# Patient Record
Sex: FEMALE | Race: ASIAN | NOT HISPANIC OR LATINO | URBAN - METROPOLITAN AREA
[De-identification: names, ages, dates, MRNs, and addresses within clinical notes are randomized per-mention and may not be internally consistent; named-entity substitution may affect disease eponyms.]

---

## 2021-04-01 ENCOUNTER — OFFICE VISIT (OUTPATIENT)
Dept: URBAN - METROPOLITAN AREA CLINIC 35 | Facility: CLINIC | Age: 66
End: 2021-04-01

## 2021-04-01 VITALS
BODY MASS INDEX: 17.47 KG/M2 | HEART RATE: 84 BPM | OXYGEN SATURATION: 97 % | HEIGHT: 60 IN | WEIGHT: 89 LBS | SYSTOLIC BLOOD PRESSURE: 130 MMHG | DIASTOLIC BLOOD PRESSURE: 75 MMHG

## 2021-04-01 PROBLEM — 71457002 OROPHARYNGEAL DYSPHAGIA: Status: ACTIVE | Noted: 2021-04-01

## 2021-04-01 RX ORDER — SODIUM SULFATE, POTASSIUM SULFATE, MAGNESIUM SULFATE 17.5; 3.13; 1.6 G/ML; G/ML; G/ML
AS DIRECTED SOLUTION, CONCENTRATE ORAL 1
Qty: 1 | Refills: 0 | Status: ON HOLD | COMMUNITY
Start: 2016-03-21

## 2021-04-01 RX ORDER — FAMOTIDINE 40 MG/1
1 TABLET AT BEDTIME TABLET, FILM COATED ORAL ONCE A DAY
Qty: 30 | Status: ACTIVE | COMMUNITY

## 2021-04-01 RX ORDER — ASPIRIN 81 MG/1
1 TABLET TABLET, COATED ORAL ONCE A DAY
Status: ON HOLD | COMMUNITY

## 2021-04-01 RX ORDER — PETROLATUM,WHITE/LANOLIN
OINTMENT (GRAM) TOPICAL
Status: ACTIVE | COMMUNITY

## 2021-04-01 RX ORDER — UBIDECARENONE 30 MG
CAPSULE ORAL
Status: ON HOLD | COMMUNITY

## 2021-04-01 RX ORDER — DIPHENHYDRAMINE HCL 25 MG
1 CAPSULE AS NEEDED CAPSULE ORAL
Status: ACTIVE | COMMUNITY

## 2021-04-01 RX ORDER — OMEPRAZOLE 40 MG/1
1 CAPSULE CAPSULE, DELAYED RELEASE ORAL
Qty: 30 | Status: ACTIVE | COMMUNITY

## 2021-04-01 NOTE — HPI-MIGRATED HPI
;     Dysphagia : 64 y/o female patient admits recently onset of dysphagia for 2-3 years. The symptoms include/are described as food particles getting stuck in her throat or globus.  Patient states that she has tried Omeprazole for 2 years and Famotidine for 1 year with no efficacy.  Patient notes that she drinks coffee or tea daily in the morning and 1-2 glasses of wine daily at evening. Patient admits having to cough at night time and feels anxious that she will end up choking. Patient admits bloating/gas, indigestion, early satiety, changes in appetite, coughing, abdominal/epigastric pain.  Patient denies excessive belching, globus, sour eructations, or changes in bowel habits.  Patient states that she has not had any recent changes in their medications.  Patient admits 1 -2 formed movements per day with no mucus, melena, or blood in stools. Patient admits having colonoscopy in 2016 with Dr. Gatiac with internal hemorrhoids.   Patient denies family hx of gastric/esophageal cancer or diseases. Patient denies past EGD which was performed in past. ;

## 2023-10-10 ENCOUNTER — OFFICE VISIT (OUTPATIENT)
Dept: URBAN - METROPOLITAN AREA CLINIC 35 | Facility: CLINIC | Age: 68
End: 2023-10-10

## 2023-12-11 ENCOUNTER — CLAIMS CREATED FROM THE CLAIM WINDOW (OUTPATIENT)
Dept: URBAN - METROPOLITAN AREA CLINIC 35 | Facility: CLINIC | Age: 68
End: 2023-12-11
Payer: MEDICARE

## 2023-12-11 ENCOUNTER — TELEPHONE ENCOUNTER (OUTPATIENT)
Dept: URBAN - METROPOLITAN AREA CLINIC 35 | Facility: CLINIC | Age: 68
End: 2023-12-11

## 2023-12-11 ENCOUNTER — DASHBOARD ENCOUNTERS (OUTPATIENT)
Age: 68
End: 2023-12-11

## 2023-12-11 VITALS
BODY MASS INDEX: 16.88 KG/M2 | SYSTOLIC BLOOD PRESSURE: 112 MMHG | DIASTOLIC BLOOD PRESSURE: 78 MMHG | HEIGHT: 60 IN | WEIGHT: 86 LBS

## 2023-12-11 DIAGNOSIS — L29.0 PRURITUS ANI: ICD-10-CM

## 2023-12-11 DIAGNOSIS — R05.3 CHRONIC COUGH: ICD-10-CM

## 2023-12-11 DIAGNOSIS — K64.8 OTHER HEMORRHOIDS: ICD-10-CM

## 2023-12-11 PROCEDURE — 99214 OFFICE O/P EST MOD 30 MIN: CPT | Performed by: PHYSICIAN ASSISTANT

## 2023-12-11 RX ORDER — ASPIRIN 81 MG/1
1 TABLET TABLET, COATED ORAL ONCE A DAY
Status: ON HOLD | COMMUNITY

## 2023-12-11 RX ORDER — SODIUM SULFATE, POTASSIUM SULFATE, MAGNESIUM SULFATE 17.5; 3.13; 1.6 G/ML; G/ML; G/ML
AS DIRECTED SOLUTION, CONCENTRATE ORAL 1
Qty: 1 | Refills: 0 | Status: ON HOLD | COMMUNITY
Start: 2016-03-21

## 2023-12-11 RX ORDER — DIPHENHYDRAMINE HCL 25 MG
1 CAPSULE AS NEEDED CAPSULE ORAL
Status: ACTIVE | COMMUNITY

## 2023-12-11 RX ORDER — PHENYLEPHRINE HYDROCHLORIDE 6.25 MG/1
AS DIRECTED SUPPOSITORY RECTAL
Qty: 60 | Refills: 3 | OUTPATIENT
Start: 2023-12-11 | End: 2024-04-09

## 2023-12-11 RX ORDER — PETROLATUM,WHITE/LANOLIN
OINTMENT (GRAM) TOPICAL
Status: ON HOLD | COMMUNITY

## 2023-12-11 RX ORDER — PANTOPRAZOLE SODIUM 40 MG/1
1 TABLET TABLET, DELAYED RELEASE ORAL ONCE A DAY
Qty: 30 | OUTPATIENT
Start: 2023-12-11

## 2023-12-11 RX ORDER — FAMOTIDINE 40 MG/1
1 TABLET AT BEDTIME TABLET, FILM COATED ORAL ONCE A DAY
Qty: 30 | Status: ACTIVE | COMMUNITY

## 2023-12-11 RX ORDER — OMEPRAZOLE 40 MG/1
1 CAPSULE CAPSULE, DELAYED RELEASE ORAL
Qty: 30 | Status: ACTIVE | COMMUNITY

## 2023-12-11 RX ORDER — UBIDECARENONE 30 MG
CAPSULE ORAL
Status: ON HOLD | COMMUNITY

## 2023-12-11 NOTE — HPI-HEMORRHOIDS
67 year old female patient presents today for severe hemorrhoids. She admits they have been bothering her for over a year. She admits to a Colonoscopy 02/03/2023 in [facility] and found hemorrhoids. She admits incomplete emptying when she has a bowel movement. She admits to rectal pain and pruritus ani. She admits trying Preparation H cream and wipes with short periods of relief. She admits her doctor prescribed Anucort which only works for short periods of time. She denies any blood when wiping. She admits she will have daily bowel movements, multiple a day with #4 on the [place] Scale, takes Miralax daily.

## 2023-12-11 NOTE — PHYSICAL EXAM CONSTITUTIONAL:
well developed, well nourished , in no acute distress , ambulating without difficulty , normal communication ability
07-Nov-2019

## 2024-01-03 ENCOUNTER — OFFICE VISIT (OUTPATIENT)
Dept: URBAN - METROPOLITAN AREA CLINIC 33 | Facility: CLINIC | Age: 69
End: 2024-01-03

## 2024-09-06 ENCOUNTER — OFFICE VISIT (OUTPATIENT)
Dept: URBAN - METROPOLITAN AREA CLINIC 35 | Facility: CLINIC | Age: 69
End: 2024-09-06

## 2024-09-06 ENCOUNTER — OFFICE VISIT (OUTPATIENT)
Dept: URBAN - METROPOLITAN AREA CLINIC 35 | Facility: CLINIC | Age: 69
End: 2024-09-06
Payer: MEDICARE

## 2024-09-06 VITALS
DIASTOLIC BLOOD PRESSURE: 76 MMHG | SYSTOLIC BLOOD PRESSURE: 110 MMHG | HEIGHT: 60 IN | BODY MASS INDEX: 16.88 KG/M2 | WEIGHT: 86 LBS

## 2024-09-06 DIAGNOSIS — K64.8 OTHER HEMORRHOIDS: ICD-10-CM

## 2024-09-06 DIAGNOSIS — L29.0 PRURITUS ANI: ICD-10-CM

## 2024-09-06 DIAGNOSIS — R13.14 PHARYNGOESOPHAGEAL DYSPHAGIA: ICD-10-CM

## 2024-09-06 DIAGNOSIS — R05.3 CHRONIC COUGH: ICD-10-CM

## 2024-09-06 PROBLEM — 40739000: Status: ACTIVE | Noted: 2024-09-06

## 2024-09-06 PROCEDURE — 99214 OFFICE O/P EST MOD 30 MIN: CPT | Performed by: PHYSICIAN ASSISTANT

## 2024-09-06 RX ORDER — ASPIRIN 81 MG/1
1 TABLET TABLET, COATED ORAL ONCE A DAY
Status: ON HOLD | COMMUNITY

## 2024-09-06 RX ORDER — PANTOPRAZOLE SODIUM 40 MG/1
1 TABLET TABLET, DELAYED RELEASE ORAL ONCE A DAY
Qty: 30 | Status: ACTIVE | COMMUNITY
Start: 2023-12-11

## 2024-09-06 RX ORDER — FAMOTIDINE 40 MG/1
1 TABLET AT BEDTIME TABLET, FILM COATED ORAL ONCE A DAY
Qty: 30 | Status: ACTIVE | COMMUNITY

## 2024-09-06 RX ORDER — OMEPRAZOLE 40 MG/1
1 CAPSULE CAPSULE, DELAYED RELEASE ORAL
Qty: 30 | Status: ON HOLD | COMMUNITY

## 2024-09-06 RX ORDER — ATORVASTATIN CALCIUM 20 MG/1
1 TABLET TABLET, FILM COATED ORAL ONCE A DAY
Status: ACTIVE | COMMUNITY

## 2024-09-06 RX ORDER — PANTOPRAZOLE SODIUM 40 MG/1
1 TABLET TABLET, DELAYED RELEASE ORAL ONCE A DAY
Qty: 30 | OUTPATIENT

## 2024-09-06 RX ORDER — SODIUM SULFATE, POTASSIUM SULFATE, MAGNESIUM SULFATE 17.5; 3.13; 1.6 G/ML; G/ML; G/ML
AS DIRECTED SOLUTION, CONCENTRATE ORAL 1
Qty: 1 | Refills: 0 | Status: ON HOLD | COMMUNITY
Start: 2016-03-21

## 2024-09-06 RX ORDER — DIPHENHYDRAMINE HCL 25 MG
1 CAPSULE AS NEEDED CAPSULE ORAL
Status: ON HOLD | COMMUNITY

## 2024-09-06 RX ORDER — UBIDECARENONE 30 MG
CAPSULE ORAL
Status: ON HOLD | COMMUNITY

## 2024-09-06 RX ORDER — PETROLATUM,WHITE/LANOLIN
OINTMENT (GRAM) TOPICAL
Status: ON HOLD | COMMUNITY

## 2024-09-06 NOTE — HPI-HEMORRHOIDS
67 year old female patient presents today for severe hemorrhoids. She admits they have been bothering her for over a year. She admits to a Colonoscopy 02/03/2023 in [facility] and found hemorrhoids. She admits incomplete emptying when she has a bowel movement. She admits to rectal pain and pruritus ani. She admits trying Preparation H cream and wipes with short periods of relief. She admits her doctor prescribed Anucort which only works for short periods of time. She denies any blood when wiping. She admits she will have daily bowel movements, multiple a day with #4 on the [place] Scale, takes Miralax daily. She feels she is iincompletely evacuating when she has a BM.

## 2024-09-06 NOTE — HPI-ACID REFLUX
Patient presents today for a follow up. She admits continued mucus in her throat and coughing. She admits to dysphagia. She admits dysphagia with both solids and liquids. She admits use of Pantoprazole and Famotidine. She has never had an EGD.  She is on Omeprazole 20mg daily. She denies any heartburn, N/V, early satiety, abdominal hannah.  Last visit: Pt admits to mucus in her throat and gets cough.  She thinks it's reflux related and has tried Omeprazole 20mg daily without relief. Pt has been on this medication for 2 years. She needs an EGD, but having shoulder issues and can't have a procedure at thsi time.  No dysphagia, heartburn, N/V, early satiety.

## 2024-10-09 ENCOUNTER — OFFICE VISIT (OUTPATIENT)
Dept: URBAN - METROPOLITAN AREA SURGERY CENTER 8 | Facility: SURGERY CENTER | Age: 69
End: 2024-10-09

## 2024-10-09 ENCOUNTER — CLAIMS CREATED FROM THE CLAIM WINDOW (OUTPATIENT)
Dept: URBAN - METROPOLITAN AREA SURGERY CENTER 8 | Facility: SURGERY CENTER | Age: 69
End: 2024-10-09
Payer: MEDICARE

## 2024-10-09 ENCOUNTER — CLAIMS CREATED FROM THE CLAIM WINDOW (OUTPATIENT)
Dept: URBAN - METROPOLITAN AREA CLINIC 4 | Facility: CLINIC | Age: 69
End: 2024-10-09
Payer: MEDICARE

## 2024-10-09 DIAGNOSIS — K21.9 ACID REFLUX: ICD-10-CM

## 2024-10-09 DIAGNOSIS — R13.10 DYSPHAGIA: ICD-10-CM

## 2024-10-09 DIAGNOSIS — K21.9 GASTRO-ESOPHAGEAL REFLUX DISEASE WITHOUT ESOPHAGITIS: ICD-10-CM

## 2024-10-09 DIAGNOSIS — K22.4 ESOPHAGEAL DYSMOTILITY: ICD-10-CM

## 2024-10-09 DIAGNOSIS — B96.81 BACTERIAL INFECTION DUE TO H. PYLORI: ICD-10-CM

## 2024-10-09 DIAGNOSIS — K22.89 OTHER SPECIFIED DISEASE OF ESOPHAGUS: ICD-10-CM

## 2024-10-09 DIAGNOSIS — R13.19 OTHER DYSPHAGIA: ICD-10-CM

## 2024-10-09 DIAGNOSIS — K31.89 OTHER DISEASES OF STOMACH AND DUODENUM: ICD-10-CM

## 2024-10-09 DIAGNOSIS — K29.60 OTHER GASTRITIS WITHOUT BLEEDING: ICD-10-CM

## 2024-10-09 DIAGNOSIS — B96.81 HELICOBACTER PYLORI [H. PYLORI] AS THE CAUSE OF DISEASES CLASSIFIED ELSEWHERE: ICD-10-CM

## 2024-10-09 DIAGNOSIS — K44.9 HIATAL HERNIA: ICD-10-CM

## 2024-10-09 PROBLEM — 90446007: Status: ACTIVE | Noted: 2024-10-09

## 2024-10-09 PROBLEM — 249648007: Status: ACTIVE | Noted: 2024-10-09

## 2024-10-09 PROCEDURE — 43239 EGD BIOPSY SINGLE/MULTIPLE: CPT | Performed by: INTERNAL MEDICINE

## 2024-10-09 PROCEDURE — 00731 ANES UPR GI NDSC PX NOS: CPT | Performed by: NURSE ANESTHETIST, CERTIFIED REGISTERED

## 2024-10-09 PROCEDURE — 43248 EGD GUIDE WIRE INSERTION: CPT | Performed by: INTERNAL MEDICINE

## 2024-10-09 PROCEDURE — 88305 TISSUE EXAM BY PATHOLOGIST: CPT | Performed by: PATHOLOGY

## 2024-10-09 PROCEDURE — 88342 IMHCHEM/IMCYTCHM 1ST ANTB: CPT | Performed by: PATHOLOGY

## 2024-10-09 RX ORDER — NIFEDIPINE
PEA SIZE AMOUNT POWDER (GRAM) MISCELLANEOUS
Qty: 30 GRAMS | Refills: 1 | OUTPATIENT
Start: 2024-10-09 | End: 2024-12-07

## 2024-10-09 RX ORDER — UBIDECARENONE 30 MG
CAPSULE ORAL
Status: ON HOLD | COMMUNITY

## 2024-10-09 RX ORDER — DIPHENHYDRAMINE HCL 25 MG
1 CAPSULE AS NEEDED CAPSULE ORAL
Status: ON HOLD | COMMUNITY

## 2024-10-09 RX ORDER — SODIUM SULFATE, POTASSIUM SULFATE, MAGNESIUM SULFATE 17.5; 3.13; 1.6 G/ML; G/ML; G/ML
AS DIRECTED SOLUTION, CONCENTRATE ORAL 1
Qty: 1 | Refills: 0 | Status: ON HOLD | COMMUNITY
Start: 2016-03-21

## 2024-10-09 RX ORDER — PETROLATUM,WHITE/LANOLIN
OINTMENT (GRAM) TOPICAL
Status: ON HOLD | COMMUNITY

## 2024-10-09 RX ORDER — CLOTRIMAZOLE 1 %
1 APPLICATION CREAM (GRAM) TOPICAL TWICE A DAY
Qty: 30 GRAM | Refills: 1 | OUTPATIENT
Start: 2024-10-09 | End: 2024-11-05

## 2024-10-09 RX ORDER — PANTOPRAZOLE SODIUM 40 MG/1
1 TABLET TABLET, DELAYED RELEASE ORAL ONCE A DAY
Qty: 30 | Status: ACTIVE | COMMUNITY

## 2024-10-09 RX ORDER — FAMOTIDINE 40 MG/1
1 TABLET AT BEDTIME TABLET, FILM COATED ORAL ONCE A DAY
Qty: 30 | Status: ACTIVE | COMMUNITY

## 2024-10-09 RX ORDER — OMEPRAZOLE 40 MG/1
1 CAPSULE CAPSULE, DELAYED RELEASE ORAL
Qty: 30 | Status: ON HOLD | COMMUNITY

## 2024-10-09 RX ORDER — ATORVASTATIN CALCIUM 20 MG/1
1 TABLET TABLET, FILM COATED ORAL ONCE A DAY
Status: ACTIVE | COMMUNITY

## 2024-10-09 RX ORDER — ASPIRIN 81 MG/1
1 TABLET TABLET, COATED ORAL ONCE A DAY
Status: ON HOLD | COMMUNITY

## 2024-10-31 ENCOUNTER — TELEPHONE ENCOUNTER (OUTPATIENT)
Dept: URBAN - METROPOLITAN AREA CLINIC 35 | Facility: CLINIC | Age: 69
End: 2024-10-31

## 2024-11-01 ENCOUNTER — OFFICE VISIT (OUTPATIENT)
Dept: URBAN - METROPOLITAN AREA CLINIC 35 | Facility: CLINIC | Age: 69
End: 2024-11-01
Payer: MEDICARE

## 2024-11-01 VITALS
WEIGHT: 85 LBS | BODY MASS INDEX: 16.69 KG/M2 | SYSTOLIC BLOOD PRESSURE: 110 MMHG | DIASTOLIC BLOOD PRESSURE: 78 MMHG | HEIGHT: 60 IN

## 2024-11-01 DIAGNOSIS — R13.14 PHARYNGOESOPHAGEAL DYSPHAGIA: ICD-10-CM

## 2024-11-01 DIAGNOSIS — K59.4 ANAL SPHINCTER SPASM: ICD-10-CM

## 2024-11-01 DIAGNOSIS — L29.0 PRURITUS ANI: ICD-10-CM

## 2024-11-01 DIAGNOSIS — A04.8 H. PYLORI INFECTION: ICD-10-CM

## 2024-11-01 DIAGNOSIS — K44.9 HIATAL HERNIA: ICD-10-CM

## 2024-11-01 PROCEDURE — 99214 OFFICE O/P EST MOD 30 MIN: CPT | Performed by: PHYSICIAN ASSISTANT

## 2024-11-01 RX ORDER — UBIDECARENONE 30 MG
CAPSULE ORAL
Status: ON HOLD | COMMUNITY

## 2024-11-01 RX ORDER — NIFEDIPINE
PEA SIZE AMOUNT POWDER (GRAM) MISCELLANEOUS
Qty: 30 GRAMS | Refills: 1 | Status: ACTIVE | COMMUNITY
Start: 2024-10-09 | End: 2024-12-07

## 2024-11-01 RX ORDER — TETRACYCLINE HYDROCHLORIDE 500 MG/1
1 CAPSULE ON AN EMPTY STOMACH CAPSULE ORAL
Qty: 56 CAPSULE | Refills: 0 | OUTPATIENT
Start: 2024-11-01 | End: 2024-11-14

## 2024-11-01 RX ORDER — METRONIDAZOLE 250 MG/1
1 TABLET TABLET ORAL
Qty: 56 TABLET | Refills: 0 | OUTPATIENT
Start: 2024-11-01 | End: 2024-11-14

## 2024-11-01 RX ORDER — SODIUM SULFATE, POTASSIUM SULFATE, MAGNESIUM SULFATE 17.5; 3.13; 1.6 G/ML; G/ML; G/ML
AS DIRECTED SOLUTION, CONCENTRATE ORAL 1
Qty: 1 | Refills: 0 | Status: ON HOLD | COMMUNITY
Start: 2016-03-21

## 2024-11-01 RX ORDER — PANTOPRAZOLE SODIUM 40 MG/1
1 TABLET TABLET, DELAYED RELEASE ORAL ONCE A DAY
Qty: 30 | Status: ACTIVE | COMMUNITY

## 2024-11-01 RX ORDER — CLOTRIMAZOLE 1 %
1 APPLICATION CREAM (GRAM) TOPICAL TWICE A DAY
Qty: 30 GRAM | Refills: 1 | Status: ACTIVE | COMMUNITY
Start: 2024-10-09 | End: 2024-11-05

## 2024-11-01 RX ORDER — CLOTRIMAZOLE 1 %
1 APPLICATION CREAM (GRAM) TOPICAL TWICE A DAY
Qty: 30 GRAM | Refills: 1 | OUTPATIENT

## 2024-11-01 RX ORDER — BISMUTH SUBSALICYLATE 262 MG/1
2 TABLETS EVERY 30 MINUTES TO 1 HOUR AS NEEDED TABLET, CHEWABLE ORAL
Qty: 112 | Refills: 0 | OUTPATIENT
Start: 2024-11-01 | End: 2024-11-14

## 2024-11-01 RX ORDER — DIPHENHYDRAMINE HCL 25 MG
1 CAPSULE AS NEEDED CAPSULE ORAL
Status: ON HOLD | COMMUNITY

## 2024-11-01 RX ORDER — ASPIRIN 81 MG/1
1 TABLET TABLET, COATED ORAL ONCE A DAY
Status: ON HOLD | COMMUNITY

## 2024-11-01 RX ORDER — PETROLATUM,WHITE/LANOLIN
OINTMENT (GRAM) TOPICAL
Status: ON HOLD | COMMUNITY

## 2024-11-01 RX ORDER — NIFEDIPINE
PEA SIZE AMOUNT POWDER (GRAM) MISCELLANEOUS
Qty: 30 GRAMS | Refills: 1 | OUTPATIENT

## 2024-11-01 RX ORDER — OMEPRAZOLE 40 MG/1
1 CAPSULE CAPSULE, DELAYED RELEASE ORAL
Qty: 30 | Status: ON HOLD | COMMUNITY

## 2024-11-01 RX ORDER — ATORVASTATIN CALCIUM 20 MG/1
1 TABLET TABLET, FILM COATED ORAL ONCE A DAY
Status: ACTIVE | COMMUNITY

## 2024-11-01 RX ORDER — FAMOTIDINE 40 MG/1
1 TABLET AT BEDTIME TABLET, FILM COATED ORAL ONCE A DAY
Qty: 30 | Status: ACTIVE | COMMUNITY

## 2024-11-01 NOTE — HPI-HEMORRHOIDS
MD Elyssa Napier LPN; [facility] Nurse Msg Pool   Caller: Unspecified (Yesterday,  2:03 PM)             These symptoms are most likely because she underwent an esophageal dilation.   She can take liquid Carafate 1 g q.i.d. for 7 days.  Recommend complete avoidance of NSAIDs.         She denies being able to complete hemorrhoid banding. She admits using Preparation H suppositories BID. She admits continued use of Miralax daily. She admits following a high fiber diet. She denies avoiding straining/sitting long periods. She admits during her EGD she got a rectal exam and was prescribed an ointment which is helping.   Last visit: 67 year old female patient presents today for severe hemorrhoids. She admits they have been bothering her for over a year. She admits to a Colonoscopy 02/03/2023 in [facility] and found hemorrhoids. She admits incomplete emptying when she has a bowel movement. She admits to rectal pain and pruritus ani. She admits trying Preparation H cream and wipes with short periods of relief. She admits her doctor prescribed Anucort which only works for short periods of time. She denies any blood when wiping. She admits she will have daily bowel movements, multiple a day with #4 on the [place] Scale, takes Miralax daily. She feels she is iincompletely evacuating when she has a BM.

## 2024-11-01 NOTE — HPI-ACID REFLUX
Patient presents today for a follow up. She admits taking Pantoprazole. She admits continued dysphagia and mucus. She admits continued coughing.  EGD report shows: Z-line regular, 35 cm from the incisors. 3 cm hiatal hernia. Abnormal esophageal motility. Dilated. Normal mucosa was found in the entire esophagus. Biopsied. Erythematous mucosa in the stomach. Biopsied. Normal examined duodenum. Stomach, Antrum;Body, Biopsy (Cold Forceps): CHRONIC HELICOBACTER GASTRITIS; H. PYLORI ORGANISMS IDENTIFIED.NO EVIDENCE OF INTESTINAL METAPLASIA. Esophagus, Distal, Biopsy (Cold Forceps): SQUAMOUS MUCOSA WITH REFLUX-TYPE CHANGES; NO COLUMNAR MUCOSA             IDENTIFIED. Esophagus, Proximal, Biopsy (Cold Forceps): NO SIGNIFICANT ABNORMALITY.  Last visit: Patient presents today for a follow up. She admits continued mucus in her throat and coughing. She admits to dysphagia. She admits dysphagia with both solids and liquids. She admits use of Pantoprazole and Famotidine. She has never had an EGD.  She is on Omeprazole 20mg daily. She denies any heartburn, N/V, early satiety, abdominal hannah.  Last visit: Pt admits to mucus in her throat and gets cough.  She thinks it's reflux related and has tried Omeprazole 20mg daily without relief. Pt has been on this medication for 2 years. She needs an EGD, but having shoulder issues and can't have a procedure at thsi time.  No dysphagia, heartburn, N/V, early satiety.

## 2024-11-11 ENCOUNTER — TELEPHONE ENCOUNTER (OUTPATIENT)
Dept: URBAN - METROPOLITAN AREA CLINIC 35 | Facility: CLINIC | Age: 69
End: 2024-11-11

## 2024-11-11 RX ORDER — CLARITHROMYCIN 500 MG/1
1 TABLET TABLET, FILM COATED ORAL
Qty: 20 | OUTPATIENT
Start: 2024-11-12 | End: 2024-11-22

## 2024-11-11 RX ORDER — AMOXICILLIN 500 MG/1
1 TABLET TABLET, FILM COATED ORAL
Qty: 20 TABLET | Refills: 0 | OUTPATIENT
Start: 2024-11-12 | End: 2024-11-22

## 2024-11-24 ENCOUNTER — WEB ENCOUNTER (OUTPATIENT)
Dept: URBAN - METROPOLITAN AREA CLINIC 35 | Facility: CLINIC | Age: 69
End: 2024-11-24

## 2024-12-16 ENCOUNTER — OFFICE VISIT (OUTPATIENT)
Dept: URBAN - METROPOLITAN AREA CLINIC 35 | Facility: CLINIC | Age: 69
End: 2024-12-16

## 2025-08-29 ENCOUNTER — OFFICE VISIT (OUTPATIENT)
Dept: URBAN - METROPOLITAN AREA CLINIC 35 | Facility: CLINIC | Age: 70
End: 2025-08-29
Payer: MEDICARE

## 2025-08-29 DIAGNOSIS — K64.8 INTERNAL HEMORRHOIDS: ICD-10-CM

## 2025-08-29 DIAGNOSIS — L29.0 PRURITUS ANI: ICD-10-CM

## 2025-08-29 DIAGNOSIS — R09.A2 GLOBUS SYNDROME: ICD-10-CM

## 2025-08-29 DIAGNOSIS — K21.9 CHRONIC GERD: ICD-10-CM

## 2025-08-29 PROBLEM — 267103008: Status: ACTIVE | Noted: 2025-08-29

## 2025-08-29 PROBLEM — 90458007: Status: ACTIVE | Noted: 2025-08-29

## 2025-08-29 PROBLEM — 235595009: Status: ACTIVE | Noted: 2025-08-29

## 2025-08-29 PROCEDURE — 99214 OFFICE O/P EST MOD 30 MIN: CPT | Performed by: INTERNAL MEDICINE

## 2025-08-29 RX ORDER — PETROLATUM,WHITE/LANOLIN
OINTMENT (GRAM) TOPICAL
Status: ON HOLD | COMMUNITY

## 2025-08-29 RX ORDER — BUTENAFINE HCL 1 %
1 APPLICATION CREAM (GRAM) TOPICAL TWICE A DAY
Qty: 30 GRAM | Refills: 1 | Status: ACTIVE | COMMUNITY

## 2025-08-29 RX ORDER — SODIUM SULFATE, POTASSIUM SULFATE, MAGNESIUM SULFATE 17.5; 3.13; 1.6 G/ML; G/ML; G/ML
AS DIRECTED SOLUTION, CONCENTRATE ORAL 1
Qty: 1 | Refills: 0 | Status: ON HOLD | COMMUNITY
Start: 2016-03-21

## 2025-08-29 RX ORDER — CLOTRIMAZOLE AND BETAMETHASONE DIPROPIONATE 10; .5 MG/G; MG/G
1 APPLICATION CREAM TOPICAL TWICE A DAY
Qty: 1 | Refills: 3 | OUTPATIENT
Start: 2025-08-29 | End: 2025-12-27

## 2025-08-29 RX ORDER — FAMOTIDINE 40 MG/1
1 TABLET AT BEDTIME TABLET, FILM COATED ORAL ONCE A DAY
Qty: 30 | Status: ACTIVE | COMMUNITY

## 2025-08-29 RX ORDER — OMEPRAZOLE 40 MG/1
1 CAPSULE CAPSULE, DELAYED RELEASE ORAL
Qty: 30 | Status: ON HOLD | COMMUNITY

## 2025-08-29 RX ORDER — UBIDECARENONE 30 MG
CAPSULE ORAL
Status: ON HOLD | COMMUNITY

## 2025-08-29 RX ORDER — HYDROCORTISONE ACETATE 25 MG/1
1 SUPPOSITORY SUPPOSITORY RECTAL ONCE A DAY
Qty: 30 | Refills: 1 | OUTPATIENT
Start: 2025-08-29 | End: 2025-10-28

## 2025-08-29 RX ORDER — PANTOPRAZOLE SODIUM 40 MG/1
1 TABLET TABLET, DELAYED RELEASE ORAL ONCE A DAY
Qty: 30 | Status: ACTIVE | COMMUNITY

## 2025-08-29 RX ORDER — ATORVASTATIN CALCIUM 20 MG/1
1 TABLET TABLET, FILM COATED ORAL ONCE A DAY
Status: ACTIVE | COMMUNITY

## 2025-08-29 RX ORDER — NIFEDIPINE
PEA SIZE AMOUNT POWDER (GRAM) MISCELLANEOUS
Qty: 30 GRAMS | Refills: 1 | Status: ON HOLD | COMMUNITY

## 2025-08-29 RX ORDER — ASPIRIN 81 MG/1
1 TABLET TABLET, COATED ORAL ONCE A DAY
Status: ON HOLD | COMMUNITY

## 2025-08-29 RX ORDER — DIPHENHYDRAMINE HCL 25 MG
1 CAPSULE AS NEEDED CAPSULE ORAL
Status: ON HOLD | COMMUNITY